# Patient Record
Sex: FEMALE | Race: WHITE | NOT HISPANIC OR LATINO | Employment: STUDENT | URBAN - METROPOLITAN AREA
[De-identification: names, ages, dates, MRNs, and addresses within clinical notes are randomized per-mention and may not be internally consistent; named-entity substitution may affect disease eponyms.]

---

## 2023-03-16 DIAGNOSIS — D68.61 ANTIPHOSPHOLIPID SYNDROME: Primary | ICD-10-CM

## 2023-03-17 ENCOUNTER — LAB (OUTPATIENT)
Dept: LAB | Facility: HOSPITAL | Age: 18
End: 2023-03-17
Payer: COMMERCIAL

## 2023-03-17 ENCOUNTER — CONSULT (OUTPATIENT)
Dept: ONCOLOGY | Facility: CLINIC | Age: 18
End: 2023-03-17
Payer: COMMERCIAL

## 2023-03-17 VITALS
BODY MASS INDEX: 18.3 KG/M2 | WEIGHT: 103.3 LBS | HEART RATE: 78 BPM | OXYGEN SATURATION: 95 % | DIASTOLIC BLOOD PRESSURE: 72 MMHG | HEIGHT: 63 IN | TEMPERATURE: 97.8 F | RESPIRATION RATE: 16 BRPM | SYSTOLIC BLOOD PRESSURE: 113 MMHG

## 2023-03-17 DIAGNOSIS — D72.819 LEUKOPENIA, UNSPECIFIED TYPE: ICD-10-CM

## 2023-03-17 DIAGNOSIS — Z86.2 HISTORY OF THROMBOCYTOPENIA: ICD-10-CM

## 2023-03-17 DIAGNOSIS — Q21.12 PFO (PATENT FORAMEN OVALE): ICD-10-CM

## 2023-03-17 DIAGNOSIS — R76.0 ANTIPHOSPHOLIPID ANTIBODY POSITIVE: Primary | ICD-10-CM

## 2023-03-17 DIAGNOSIS — D68.61 ANTIPHOSPHOLIPID SYNDROME: ICD-10-CM

## 2023-03-17 LAB
BASOPHILS # BLD AUTO: 0.02 10*3/MM3 (ref 0–0.2)
BASOPHILS NFR BLD AUTO: 0.4 % (ref 0–1.5)
DEPRECATED RDW RBC AUTO: 42.3 FL (ref 37–54)
EOSINOPHIL # BLD AUTO: 0.04 10*3/MM3 (ref 0–0.4)
EOSINOPHIL NFR BLD AUTO: 0.8 % (ref 0.3–6.2)
ERYTHROCYTE [DISTWIDTH] IN BLOOD BY AUTOMATED COUNT: 13.3 % (ref 12.3–15.4)
HCT VFR BLD AUTO: 38.3 % (ref 34–46.6)
HGB BLD-MCNC: 12.9 G/DL (ref 12–15.9)
IMM GRANULOCYTES # BLD AUTO: 0.03 10*3/MM3 (ref 0–0.05)
IMM GRANULOCYTES NFR BLD AUTO: 0.6 % (ref 0–0.5)
LYMPHOCYTES # BLD AUTO: 1.53 10*3/MM3 (ref 0.7–3.1)
LYMPHOCYTES NFR BLD AUTO: 32.5 % (ref 19.6–45.3)
MCH RBC QN AUTO: 29.5 PG (ref 26.6–33)
MCHC RBC AUTO-ENTMCNC: 33.7 G/DL (ref 31.5–35.7)
MCV RBC AUTO: 87.6 FL (ref 79–97)
MONOCYTES # BLD AUTO: 0.33 10*3/MM3 (ref 0.1–0.9)
MONOCYTES NFR BLD AUTO: 7 % (ref 5–12)
NEUTROPHILS NFR BLD AUTO: 2.76 10*3/MM3 (ref 1.7–7)
NEUTROPHILS NFR BLD AUTO: 58.7 % (ref 42.7–76)
NRBC BLD AUTO-RTO: 0 /100 WBC (ref 0–0.2)
PLATELET # BLD AUTO: 273 10*3/MM3 (ref 140–450)
PMV BLD AUTO: 9.6 FL (ref 6–12)
RBC # BLD AUTO: 4.37 10*6/MM3 (ref 3.77–5.28)
WBC NRBC COR # BLD: 4.71 10*3/MM3 (ref 3.4–10.8)

## 2023-03-17 PROCEDURE — 85025 COMPLETE CBC W/AUTO DIFF WBC: CPT

## 2023-03-17 PROCEDURE — 36415 COLL VENOUS BLD VENIPUNCTURE: CPT | Performed by: INTERNAL MEDICINE

## 2023-03-17 PROCEDURE — 99244 OFF/OP CNSLTJ NEW/EST MOD 40: CPT | Performed by: INTERNAL MEDICINE

## 2023-03-17 RX ORDER — CETIRIZINE HYDROCHLORIDE 10 MG/1
1 TABLET ORAL DAILY
COMMUNITY

## 2023-03-17 RX ORDER — SERTRALINE HYDROCHLORIDE 25 MG/1
1 TABLET, FILM COATED ORAL DAILY
COMMUNITY
Start: 2022-12-20

## 2023-03-17 RX ORDER — CALCIUM CARBONATE/VITAMIN D3 500-10/5ML
LIQUID (ML) ORAL
COMMUNITY

## 2023-03-17 RX ORDER — MULTIVIT-MIN/IRON/FOLIC ACID/K 18-600-40
CAPSULE ORAL DAILY
COMMUNITY

## 2023-03-17 NOTE — PROGRESS NOTES
"Subjective     REASON FOR CONSULTATION:  Provide an opinion on any further workup or treatment on:    Positive anticardiolipin IgG antibody                       REQUESTING PHYSICIAN: Mary Alice Borjas MD    RECORDS OBTAINED: Records of the patients history including those obtained from the referring provider were reviewed and summarized in detail.    HISTORY OF PRESENT ILLNESS:    Bharati Bright is a 17 y.o. patient who was referred for evaluation of positive anticardiolipin IgG antibody.  Patient has patent foramen ovale diagnosed at birth.  She follows with cardiology.  She was informed that the PFO was small and did not require any intervention.  She is now following with cardiology every 2 years.  No anticoagulation or antiplatelet therapy was recommended.     Patient reports that in September 2021, she felt sick and very weak.  She felt she did not have the energy to do much.  She stayed in bed for prolonged periods of time and describes this as feeling that she \"crashed\".  She did not have fever or chills or other symptoms of infection. Symptoms lasted for 2 weeks.  In late May 2022, she had recurrence of the symptoms but lasted longer.  She had significant fatigue and her family had to help her with moving around the house for about a week.  In August 2022, she had symptoms of upper respiratory infection.  A CBC was done on 8/22/2022 and she was found to have a WBC count of 4470 and platelets of 90,000.  Symptoms improved and of August 2022.    Patient was seen by rheumatology to evaluate the symptoms.  She had labs done on 9/14/2022 and was found to have anticardiolipin IgG of 63.  Labs were repeated on 12/21/2022.  The anticardiolipin IgG was still positive but the titer decreased to 16.  Lupus anticoagulant was not detected.  Antibeta-2 glycoprotein was not detected.    Patient is not having calf pain or swelling.  She reports having intermittent lower extremity pain of unclear etiology.  She has " scoliosis.    REVIEW OF SYSTEMS:  Review of Systems   Constitutional: Positive for fatigue. Negative for fever and unexpected weight change.   HENT: Positive for sinus pressure. Negative for nosebleeds and voice change.    Eyes: Negative for visual disturbance.   Respiratory: Negative for cough and shortness of breath.    Cardiovascular: Negative for chest pain and leg swelling.   Gastrointestinal: Negative for abdominal pain, blood in stool, constipation, diarrhea, nausea and vomiting.   Genitourinary: Negative for frequency and hematuria.   Musculoskeletal: Positive for back pain. Negative for joint swelling.        Leg pain   Skin: Negative for rash.   Neurological: Negative for dizziness and headaches.   Hematological: Negative for adenopathy. Does not bruise/bleed easily.   Psychiatric/Behavioral: Negative for dysphoric mood. The patient is nervous/anxious.       Past Medical History:   Diagnosis Date   • H/O PFO (patent foramen ovale)    • Scoliosis      PSH:  Tonsillectomy in 2008 or 2009    Social History     Socioeconomic History   • Marital status: Single   Tobacco Use   • Smoking status: Never   Substance and Sexual Activity   • Alcohol use: Never   • Drug use: Never   • Sexual activity: Defer     Family History   Problem Relation Age of Onset   • Heart disease Father    • Diabetes Father    • Hypertension Father    • Hypertension Maternal Grandmother    • Heart disease Maternal Grandfather    • Diabetes Maternal Grandfather    • Hypertension Maternal Grandfather    • Leukemia Maternal Grandfather    • Gout Maternal Grandfather       MEDICATIONS:    Current Outpatient Medications:   •  Ascorbic Acid (Vitamin C) 500 MG capsule, Take  by mouth Daily., Disp: , Rfl:   •  cetirizine (zyrTEC) 10 MG tablet, Take 1 tablet by mouth Daily., Disp: , Rfl:   •  Cholecalciferol 50 MCG (2000 UT) tablet, Take 1 tablet by mouth Daily., Disp: , Rfl:   •  Pediatric Multivit-Minerals-C (FLINTSTONES GUMMIES PO), Take  by  "mouth., Disp: , Rfl:   •  sertraline (ZOLOFT) 25 MG tablet, Take 1 tablet by mouth Daily., Disp: , Rfl:   •  Zinc 30 MG capsule, Take  by mouth., Disp: , Rfl:      ALLERGIES:  Allergies   Allergen Reactions   • Minocycline Hives     Hives        Objective   VITAL SIGNS:  Vitals:    03/17/23 0958   BP: 113/72   Pulse: 78   Resp: 16   Temp: 97.8 °F (36.6 °C)   TempSrc: Temporal   SpO2: 95%   Weight: 46.9 kg (103 lb 4.8 oz)   Height: 160 cm (62.99\")  Comment: new ht   PainSc: 0-No pain     PHYSICAL EXAMINATION  GENERAL:  The patient appears in good general condition, not in acute distress.  SKIN: No skin rashes. No ecchymosis.  HEAD:  Normocephalic.  EYES:  No Jaundice. No Pallor. EOMI.  NECK:  Supple with Good ROM. No Thyromegaly. No Masses.  LYMPHATICS:  No cervical lymphadenopathy.  CHEST: Normal respiratory effort. Lungs clear to auscultation.   CARDIAC:  Normal S1 & S2.  Grade 1 systolic murmur.   ABDOMEN: Nondistended.  EXTREMITIES:  No edema.  No varicose veins.  NEUROLOGICAL:  No Focal neurological deficits.     RESULT REVIEW:   Results from last 7 days   Lab Units 03/17/23  0931   WBC 10*3/mm3 4.71   NEUTROS ABS 10*3/mm3 2.76   HEMOGLOBIN g/dL 12.9   HEMATOCRIT % 38.3   PLATELETS 10*3/mm3 273     Lab Results   Component Value Date    FERRITIN 41.6 07/25/2022     Lab Results   Component Value Date    FOLATE 7.4 07/25/2022     Lab Results   Component Value Date    [id number] 407 07/25/2022       Assessment & Plan   *Positive anticardiolipin IgG antibody.  · Patient had significant fatigue x2 weeks in September 2021 and between May and August 2022 of unclear etiology.  · She was evaluated by rheumatology.  · Anticardiolipin IgG was 63 on 9/14/2022.  Antibeta 2 glycoprotein was normal.  No evidence of lupus anticoagulant.  · On 12/21/2022, labs were repeated.  Anticardiolipin IgG decreased to 16.  Testing for beta-2 glycoprotein and lupus anticoagulant was negative.  · Patient is not having typical arthritis " symptoms.  No skin rash.  · There was no definite evidence of underlying rheumatologic disorder on evaluation by the rheumatologist, Dr. Paz.  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  · Patient reports that she had COVID 2 weeks ago.  This may alter the results of today's labs.    *History of leukopenia and thrombocytopenia.  · CBC on 8/22/2022 revealed a WBC of 4470.  · Platelets were 90,000.  · This was likely secondary to a viral infection.  · WBC improved to 4710 today.  · Platelets are normal today at 273,000.  · Immature granulocyte percentage slightly elevated at 0.6%.  Absolute immature granulocytes are normal at 0.03.  · I reassured her that the low WBC and platelet counts were reactive likely secondary to the viral illness she had in 2022.    *Patent foramen ovale.  · She was diagnosed at birth.  · She follows with cardiology.  · Her cardiologist did not recommend any intervention for the PFO and did not recommend anticoagulation/antiplatelet therapy.    PLAN:    1.  Repeat anticardiolipin, beta-2 glycoprotein and lupus anticoagulant tests today.  2.  If the anticardiolipin antibody titer has become normal, a reactive process would be suspected as the cause of the elevation 2022.  Antiphospholipid syndrome would not be suspected and patient would not be expected to be at increased risk for primary thrombosis.  3.  If the level remains elevated, we will repeat the labs in 3-4 months since she just had COVID infection 2 weeks ago.    We will contact the patient with the results and provide further recommendations accordingly.        Zi Villar MD  03/17/23

## 2023-03-18 LAB
CARDIOLIPIN IGA SER IA-ACNC: <9 APL U/ML (ref 0–11)
CARDIOLIPIN IGG SER IA-ACNC: 16 GPL U/ML (ref 0–14)
CARDIOLIPIN IGM SER IA-ACNC: 13 MPL U/ML (ref 0–12)

## 2023-03-19 LAB
B2 GLYCOPROT1 IGA SER-ACNC: <9 GPI IGA UNITS (ref 0–25)
B2 GLYCOPROT1 IGG SER-ACNC: <9 GPI IGG UNITS (ref 0–20)
B2 GLYCOPROT1 IGM SER-ACNC: <9 GPI IGM UNITS (ref 0–32)

## 2023-03-21 LAB
APTT HEX PL PPP: 6 SEC (ref 0–11)
APTT SCREEN TO CONFIRM RATIO: 0.85 RATIO (ref 0–1.34)
CONFIRM APTT/NORMAL: 70.9 SEC (ref 0–47.6)
LA 2 SCREEN W REFLEX-IMP: ABNORMAL
MIXING APTT: 44.2 SEC (ref 0–40.5)
SCREEN APTT: 45.6 SEC (ref 0–43.5)
SCREEN DRVVT: 34.6 SEC (ref 0–47)
THROMBIN TIME: 20.6 SEC (ref 0–23)

## 2023-03-22 ENCOUNTER — TELEPHONE (OUTPATIENT)
Dept: ONCOLOGY | Facility: CLINIC | Age: 18
End: 2023-03-22
Payer: COMMERCIAL

## 2023-03-22 DIAGNOSIS — R76.0 ANTIPHOSPHOLIPID ANTIBODY POSITIVE: Primary | ICD-10-CM

## 2023-03-22 DIAGNOSIS — Z86.2 HISTORY OF THROMBOCYTOPENIA: ICD-10-CM

## 2023-03-22 DIAGNOSIS — D72.819 LEUKOPENIA, UNSPECIFIED TYPE: ICD-10-CM

## 2023-03-22 NOTE — TELEPHONE ENCOUNTER
----- Message from Zi Villar MD sent at 3/22/2023  7:56 AM EDT -----  Please inform the patient that the lab test showed positive anticardiolipin antibodies.  The test may have been affected by the recent COVID infection.  I recommend repeating labs-CBC, anticardiolipin Ab, beta-2 glycoprotein Ab and lupus anticoagulant in 3 months and to see her in follow-up 1 week after to review the results.    Thank you

## 2023-05-30 ENCOUNTER — LAB (OUTPATIENT)
Dept: LAB | Facility: HOSPITAL | Age: 18
End: 2023-05-30

## 2023-05-30 DIAGNOSIS — D72.819 LEUKOPENIA, UNSPECIFIED TYPE: ICD-10-CM

## 2023-05-30 DIAGNOSIS — R76.0 ANTIPHOSPHOLIPID ANTIBODY POSITIVE: ICD-10-CM

## 2023-05-30 DIAGNOSIS — Z86.2 HISTORY OF THROMBOCYTOPENIA: ICD-10-CM

## 2023-05-30 LAB
BASOPHILS # BLD AUTO: 0.02 10*3/MM3 (ref 0–0.2)
BASOPHILS NFR BLD AUTO: 0.5 % (ref 0–1.5)
DEPRECATED RDW RBC AUTO: 43.1 FL (ref 37–54)
EOSINOPHIL # BLD AUTO: 0.07 10*3/MM3 (ref 0–0.4)
EOSINOPHIL NFR BLD AUTO: 1.9 % (ref 0.3–6.2)
ERYTHROCYTE [DISTWIDTH] IN BLOOD BY AUTOMATED COUNT: 12.9 % (ref 12.3–15.4)
HCT VFR BLD AUTO: 41.6 % (ref 34–46.6)
HGB BLD-MCNC: 13.3 G/DL (ref 12–15.9)
IMM GRANULOCYTES # BLD AUTO: 0.01 10*3/MM3 (ref 0–0.05)
IMM GRANULOCYTES NFR BLD AUTO: 0.3 % (ref 0–0.5)
LYMPHOCYTES # BLD AUTO: 1.54 10*3/MM3 (ref 0.7–3.1)
LYMPHOCYTES NFR BLD AUTO: 41.1 % (ref 19.6–45.3)
MCH RBC QN AUTO: 29.2 PG (ref 26.6–33)
MCHC RBC AUTO-ENTMCNC: 32 G/DL (ref 31.5–35.7)
MCV RBC AUTO: 91.4 FL (ref 79–97)
MONOCYTES # BLD AUTO: 0.28 10*3/MM3 (ref 0.1–0.9)
MONOCYTES NFR BLD AUTO: 7.5 % (ref 5–12)
NEUTROPHILS NFR BLD AUTO: 1.83 10*3/MM3 (ref 1.7–7)
NEUTROPHILS NFR BLD AUTO: 48.7 % (ref 42.7–76)
NRBC BLD AUTO-RTO: 0 /100 WBC (ref 0–0.2)
PLATELET # BLD AUTO: 215 10*3/MM3 (ref 140–450)
PMV BLD AUTO: 10.8 FL (ref 6–12)
RBC # BLD AUTO: 4.55 10*6/MM3 (ref 3.77–5.28)
WBC NRBC COR # BLD: 3.75 10*3/MM3 (ref 3.4–10.8)

## 2023-05-30 PROCEDURE — 85025 COMPLETE CBC W/AUTO DIFF WBC: CPT

## 2023-05-30 PROCEDURE — 36415 COLL VENOUS BLD VENIPUNCTURE: CPT

## 2023-05-31 LAB
CARDIOLIPIN IGG SER IA-ACNC: 18 GPL U/ML (ref 0–14)
CARDIOLIPIN IGM SER IA-ACNC: <9 MPL U/ML (ref 0–12)

## 2023-06-01 LAB
APTT HEX PL PPP: 6 SEC (ref 0–11)
APTT SCREEN TO CONFIRM RATIO: 0.99 RATIO (ref 0–1.34)
APTT-LA 1H NP PPP: 43.3 SEC (ref 0–40.5)
B2 GLYCOPROT1 IGA SER-ACNC: <9 GPI IGA UNITS (ref 0–25)
B2 GLYCOPROT1 IGG SER-ACNC: <9 GPI IGG UNITS (ref 0–20)
B2 GLYCOPROT1 IGM SER-ACNC: <9 GPI IGM UNITS (ref 0–32)
CONFIRM APTT/NORMAL: 41 SEC (ref 0–47.6)
LA 2 SCREEN W REFLEX-IMP: ABNORMAL
MIXING APTT: 39.4 SEC (ref 0–40.5)
SCREEN APTT: 43.9 SEC (ref 0–43.5)
SCREEN DRVVT: 34.1 SEC (ref 0–47)
THROMBIN TIME: 17.9 SEC (ref 0–23)

## 2023-06-09 ENCOUNTER — OFFICE VISIT (OUTPATIENT)
Dept: ONCOLOGY | Facility: CLINIC | Age: 18
End: 2023-06-09
Payer: COMMERCIAL

## 2023-06-09 VITALS
TEMPERATURE: 98.2 F | RESPIRATION RATE: 16 BRPM | HEART RATE: 74 BPM | SYSTOLIC BLOOD PRESSURE: 114 MMHG | OXYGEN SATURATION: 99 % | BODY MASS INDEX: 18.38 KG/M2 | HEIGHT: 63 IN | DIASTOLIC BLOOD PRESSURE: 73 MMHG | WEIGHT: 103.7 LBS

## 2023-06-09 DIAGNOSIS — Z86.2 HISTORY OF THROMBOCYTOPENIA: ICD-10-CM

## 2023-06-09 DIAGNOSIS — R76.0 ANTIPHOSPHOLIPID ANTIBODY POSITIVE: Primary | ICD-10-CM

## 2023-06-09 PROCEDURE — 99213 OFFICE O/P EST LOW 20 MIN: CPT | Performed by: INTERNAL MEDICINE

## 2023-06-09 NOTE — PROGRESS NOTES
"Subjective     CHIEF COMPLAINT:      Chief Complaint   Patient presents with    Follow-up     Discuss labs      HISTORY OF PRESENT ILLNESS:     Bharati Bright is a 17 y.o. female patient who returns today for follow up on her positive anticardiolipin antibody. She returns today for follow-up and reports improvement in her energy.  She feels that her endurance is not completely back to normal but it is gradually improving.  No recent infections since her March 2023 visit.      Patient's family reports that she has some episodes of fatigue but may be related to her not eating enough.    ROS:  Pertinent ROS is in the HPI.     Past medical, surgical, social and family history were reviewed.     MEDICATIONS:    Current Outpatient Medications:     Ascorbic Acid (Vitamin C) 500 MG capsule, Take  by mouth Daily., Disp: , Rfl:     cetirizine (zyrTEC) 10 MG tablet, Take 1 tablet by mouth Daily., Disp: , Rfl:     Cholecalciferol 50 MCG (2000 UT) tablet, Take 1 tablet by mouth Daily., Disp: , Rfl:     Pediatric Multivit-Minerals-C (FLINTSTONES GUMMIES PO), Take  by mouth., Disp: , Rfl:     sertraline (ZOLOFT) 25 MG tablet, Take 1 tablet by mouth Daily., Disp: , Rfl:   Objective     VITAL SIGNS:     Vitals:    06/09/23 1017   BP: 114/73   Pulse: 74   Resp: 16   Temp: 98.2 °F (36.8 °C)   TempSrc: Temporal   SpO2: 99%   Weight: 47 kg (103 lb 11.2 oz)   Height: 160 cm (62.99\")   PainSc: 0-No pain     Body mass index is 18.37 kg/m².     PHYSICAL EXAMINATION:   GENERAL: The patient appears in good general condition, not in acute distress.   SKIN: No ecchymosis.  EYES: No jaundice. No pallor.  CHEST: Normal respiratory effort.   CVS: No edema.  ABDOMEN: Non-distended.  EXTREMITIES: No noted deformity.     DIAGNOSTIC DATA:   Component      Latest Ref Rng 3/17/2023 5/30/2023   WBC      3.40 - 10.80 10*3/mm3 4.71  3.75    RBC      3.77 - 5.28 10*6/mm3 4.37  4.55    Hemoglobin      12.0 - 15.9 g/dL 12.9  13.3    Hematocrit      34.0 - " 46.6 % 38.3  41.6    MCV      79.0 - 97.0 fL 87.6  91.4    MCH      26.6 - 33.0 pg 29.5  29.2    MCHC      31.5 - 35.7 g/dL 33.7  32.0    RDW      12.3 - 15.4 % 13.3  12.9    RDW-SD      37.0 - 54.0 fl 42.3  43.1    MPV      6.0 - 12.0 fL 9.6  10.8    Platelets      140 - 450 10*3/mm3 273  215    Neutrophil Rel %      42.7 - 76.0 % 58.7  48.7    Lymphocyte Rel %      19.6 - 45.3 % 32.5  41.1    Monocyte Rel %      5.0 - 12.0 % 7.0  7.5    Eosinophil Rel %      0.3 - 6.2 % 0.8  1.9    Basophil Rel %      0.0 - 1.5 % 0.4  0.5    Immature Granulocyte Rel %      0.0 - 0.5 % 0.6 (H)  0.3    Neutrophils Absolute      1.70 - 7.00 10*3/mm3 2.76  1.83    Lymphocytes Absolute      0.70 - 3.10 10*3/mm3 1.53  1.54    Monocytes Absolute      0.10 - 0.90 10*3/mm3 0.33  0.28    Eosinophils Absolute      0.00 - 0.40 10*3/mm3 0.04  0.07    Basophils Absolute      0.00 - 0.20 10*3/mm3 0.02  0.02    Immature Grans, Absolute      0.00 - 0.05 10*3/mm3 0.03  0.01    nRBC      0.0 - 0.2 /100 WBC 0.0  0.0       Component      Latest Ref Rng 3/17/2023 5/30/2023   Anticardiolipin IgG      0 - 14 GPL U/mL 16 (H)  18 (H)    Anticardiolipin IgM      0 - 12 MPL U/mL 13 (H)  <9    Anticardiolipin IgA      0 - 11 APL U/mL <9     Beta-2 Glyco 1 IgG      0 - 20 GPI IgG units <9  <9    Beta-2 Glyco 1 IgA      0 - 25 GPI IgA units <9  <9    Beta-2 Glyco 1 IgM      0 - 32 GPI IgM units <9  <9       *No lupus anticoagulant was detected.     Assessment & Plan    *Positive anticardiolipin IgG antibody.  Patient had significant fatigue x2 weeks in September 2021 and between May and August 2022 of unclear etiology.  She was evaluated by rheumatology.  Anticardiolipin IgG was 63 on 9/14/2022.  Antibeta 2 glycoprotein was normal.  No evidence of lupus anticoagulant.  On 12/21/2022, labs were repeated.  Anticardiolipin IgG decreased to 16.  Testing for beta-2 glycoprotein and lupus anticoagulant was negative.  Patient is not having typical arthritis symptoms.   No skin rash.  There was no definite evidence of underlying rheumatologic disorder on evaluation by the rheumatologist, Dr. Paz.  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  Patient had COVID in late February/early March 2023.    On 3/17/2023, anticardiolipin IgG was 16, anticardiolipin IgM was 13.   Lupus anticoagulant was not detected.  On 5/30/2023, anticardiolipin IgG was 18, anticardiolipin IgM was <9.  Lupus anticoagulant was not detected.  Although the antibody has persisted, the titer is low.  She is considered to have antiphospholipid syndrome.  I assured the patient about the result.    *History of leukopenia and thrombocytopenia.  CBC on 8/22/2022 revealed a WBC of 4470.  Platelets were 90,000.  WBC improved to 4710 on 3/17/2023  Platelets were normal at 273,000.  Immature granulocyte percentage slightly elevated at 0.6%.  Absolute immature granulocytes are normal at 0.03.  The increase was attributed to COVID-19 in late February/early March 2023.  CBC on 5/30/2023 revealed a normal platelet count of 215,000.  Immature granulocytes were normal at 0.3%.  The previous low platelet count was secondary to infection.    *Patent foramen ovale.  She was diagnosed at birth.  She follows with cardiology.  Her cardiologist did not recommend any intervention for the PFO and did not recommend anticoagulation/antiplatelet therapy.    PLAN:    1.  I reassured the patient about the results.  She is not considered to be at increased risk for thrombosis.  2.  I recommended repeating CBC, anticardiolipin antibody, antibeta 2  Protein and lupus anticoagulant in 1 year.  We will see her in follow-up 1 week after to review the results.          Zi Villar MD  06/09/23

## 2024-05-29 ENCOUNTER — TELEPHONE (OUTPATIENT)
Dept: ONCOLOGY | Facility: CLINIC | Age: 19
End: 2024-05-29

## 2024-05-29 NOTE — TELEPHONE ENCOUNTER
Caller: GENI TREVIÑO    Relationship to patient: FATHER    Best call back number: [phone]    Chief complaint: CANCEL    Type of visit: LAB AND F/U    Requested date: NOT RESCHEDULING AT THIS TIME     If rescheduling, when is the original appointment: 6-7

## 2025-04-01 ENCOUNTER — OFFICE VISIT (OUTPATIENT)
Dept: OBSTETRICS AND GYNECOLOGY | Facility: CLINIC | Age: 20
End: 2025-04-01
Payer: COMMERCIAL

## 2025-04-01 VITALS
HEIGHT: 63 IN | BODY MASS INDEX: 18.78 KG/M2 | WEIGHT: 106 LBS | SYSTOLIC BLOOD PRESSURE: 121 MMHG | DIASTOLIC BLOOD PRESSURE: 74 MMHG

## 2025-04-01 DIAGNOSIS — Z01.419 WOMEN'S ANNUAL ROUTINE GYNECOLOGICAL EXAMINATION: Primary | ICD-10-CM

## 2025-04-01 DIAGNOSIS — Z30.011 ENCOUNTER FOR INITIAL PRESCRIPTION OF CONTRACEPTIVE PILLS: ICD-10-CM

## 2025-04-01 RX ORDER — DROSPIRENONE AND ETHINYL ESTRADIOL 0.02-3(28)
1 KIT ORAL DAILY
Qty: 84 TABLET | Refills: 3 | Status: SHIPPED | OUTPATIENT
Start: 2025-04-01

## 2025-04-01 NOTE — PROGRESS NOTES
GYN Annual Exam     Chief Complaint   Patient presents with    Gynecologic Exam     Pt here today for AE     HPI    Bharati Bright is a 19 y.o. female who presents for annual well woman exam.  She has never been sexually active. Periods are regular every 28-30 days, lasting 5 days. Dysmenorrhea:none. No intermenstrual bleeding, spotting, or discharge. Performing SBE:no. She is getting  in  and would like to discuss contraceptive options. This is my first time meeting Bharati Bright She is here today with her mother. She is new to our office  OB History          0    Para   0    Term   0       0    AB   0    Living   0         SAB   0    IAB   0    Ectopic   0    Molar   0    Multiple   0    Live Births   0              LMP- 3/7/25  Current contraception: abstinence  Last Pap- Never  History of STD-N/A  Family history of uterine, colon or ovarian cancer: no  Family history of breast cancer: no  Gardasil Vaccine: did not complete    Past Medical History:   Diagnosis Date    Anxiety     H/O PFO (patent foramen ovale)     Scoliosis        Past Surgical History:   Procedure Laterality Date    TONSILLECTOMY           Current Outpatient Medications:     cetirizine (zyrTEC) 10 MG tablet, Take 1 tablet by mouth Daily., Disp: , Rfl:     Cholecalciferol 50 MCG (2000) tablet, Take 1 tablet by mouth Daily., Disp: , Rfl:     Pediatric Multivit-Minerals-C (FLINTSTONES GUMMIES PO), Take  by mouth., Disp: , Rfl:     sertraline (ZOLOFT) 25 MG tablet, Take 1 tablet by mouth Daily., Disp: , Rfl:     drospirenone-ethinyl estradiol (RANDAL,GIANVI) 3-0.02 MG per tablet, Take 1 tablet by mouth Daily., Disp: 84 tablet, Rfl: 3    Allergies   Allergen Reactions    Minocycline Hives     Hives         Social History     Tobacco Use    Smoking status: Never    Smokeless tobacco: Never   Vaping Use    Vaping status: Never Used   Substance Use Topics    Alcohol use: Never    Drug use: Never       Family History  "  Problem Relation Age of Onset    Hyperlipidemia Father     Heart disease Father     Diabetes Father     Hypertension Father     Heart attack Father     Hypertension Maternal Grandmother     Heart disease Maternal Grandfather     Diabetes Maternal Grandfather     Hypertension Maternal Grandfather     Leukemia Maternal Grandfather     Gout Maternal Grandfather     Kidney disease Other     Breast cancer Neg Hx     Ovarian cancer Neg Hx     Uterine cancer Neg Hx     Colon cancer Neg Hx      Review of Systems   Constitutional:  Negative for chills, fatigue and fever.   Gastrointestinal:  Negative for abdominal distention, abdominal pain, nausea and vomiting.   Genitourinary:  Negative for breast discharge, breast lump, breast pain, dysuria, frequency, menstrual problem, pelvic pain, pelvic pressure, urgency, vaginal bleeding, vaginal discharge and vaginal pain.   Musculoskeletal:  Negative for gait problem.   Skin:  Negative for rash.   Neurological:  Negative for dizziness and headache.   Psychiatric/Behavioral:  Negative for behavioral problems.        /74   Ht 160 cm (62.99\")   Wt 48.1 kg (106 lb)   LMP 03/07/2025   BMI 18.78 kg/m²     Physical Exam  Constitutional:       General: She is not in acute distress.     Appearance: Normal appearance. She is not ill-appearing, toxic-appearing or diaphoretic.   Genitourinary:      Vulva, bladder and urethral meatus normal.      No lesions in the vagina.      Right Labia: No rash, tenderness, lesions, skin changes or Bartholin's cyst.     Left Labia: No tenderness, lesions, skin changes, Bartholin's cyst or rash.     No labial fusion noted.      No inguinal adenopathy present in the right or left side.     No vaginal discharge, erythema, tenderness, bleeding or ulceration.      No vaginal prolapse present.     No vaginal atrophy present.       Right Adnexa: not tender, not full, not palpable, no mass present and not absent.     Left Adnexa: not tender, not full, " not palpable, no mass present and not absent.     No cervical motion tenderness, discharge, friability, lesion, polyp, nabothian cyst or eversion.      Uterus is not enlarged, fixed, tender, irregular or prolapsed.      No uterine mass detected.     No urethral tenderness or mass present.      Pelvic exam was performed with patient in the lithotomy position.   Breasts:     Breasts are symmetrical.      Right: Present. No swelling, bleeding, inverted nipple, mass, nipple discharge, skin change, tenderness or breast implant.      Left: Present. No swelling, bleeding, inverted nipple, mass, nipple discharge, skin change, tenderness or breast implant.   HENT:      Head: Normocephalic and atraumatic.   Eyes:      Pupils: Pupils are equal, round, and reactive to light.   Cardiovascular:      Rate and Rhythm: Normal rate.   Pulmonary:      Effort: Pulmonary effort is normal.   Abdominal:      General: There is no distension.      Palpations: Abdomen is soft. There is no mass.      Tenderness: There is no abdominal tenderness. There is no guarding.      Hernia: No hernia is present. There is no hernia in the left inguinal area or right inguinal area.   Musculoskeletal:         General: Normal range of motion.      Cervical back: Normal range of motion and neck supple. No tenderness.   Lymphadenopathy:      Cervical: No cervical adenopathy.      Upper Body:      Right upper body: No supraclavicular, axillary or pectoral adenopathy.      Left upper body: No supraclavicular, axillary or pectoral adenopathy.      Lower Body: No right inguinal adenopathy. No left inguinal adenopathy.   Neurological:      General: No focal deficit present.      Mental Status: She is alert and oriented to person, place, and time.      Cranial Nerves: No cranial nerve deficit.   Skin:     General: Skin is warm and dry.   Psychiatric:         Mood and Affect: Mood normal.         Behavior: Behavior normal.         Thought Content: Thought content  normal.         Judgment: Judgment normal.   Vitals and nursing note reviewed.       Assessment   Diagnoses and all orders for this visit:    1. Women's annual routine gynecological examination (Primary)  -     drospirenone-ethinyl estradiol (RANDAL,GIANVI) 3-0.02 MG per tablet; Take 1 tablet by mouth Daily.  Dispense: 84 tablet; Refill: 3  -     IGP, Rfx Aptima HPV ASCU    2. Encounter for initial prescription of contraceptive pills       Plan   Well woman exam: Pap smear collected. Recommend MVI daily.    Contraception: Discussed contraception options at length including pills, patch, vaginal ring, POPs,  injection, implant, and IUDs.  The risks and benefits of the methods were discussed including but not limited to the increased risk of heart attack, blood clot, and stroke.  It was discussed the contraception does not protect against sexually transmitted infections and condoms are encouraged. The patient desires to start ANICETO. Discussed start up, uses, side effects, risks vs benefits. Encouraged condoms for the first 3 weeks of use as back up.    STD: Enc condoms. Desires STD screen today- N/A  Smoking status: nonsmoker   Encouraged annual mammogram screening starting at age 40. Instructed on how to perform SBE. Encouraged breast health self awareness.  6.    Encouraged 150 minutes of exercise per week if not medially contraindicated.   7.    BMI is within normal parameters. No other follow-up for BMI required.  8. Gardasil vaccine: We discussed that this is a vaccine to protect against the human papilloma virus. HPV can cause cervical cancer, as well as genital warts. The vaccine reduces the chance of cervical cancer by up to 70 percent. It also can protect against cancers of the vulva, vagina, anus and possibly laryngeal cancers. The vaccine is recommended for girls and boys the recommended age is 11-12, with catch up vaccination for anyone over that age until the age of 27. There are 3 vaccines in the series.      Return in about 1 year (around 4/1/2026) for Annual physical, SUDEEP Miguel.    SUDEEP Concepcion  4/1/2025  14:03 EDT

## 2025-04-03 LAB
CONV .: NORMAL
CYTOLOGIST CVX/VAG CYTO: NORMAL
CYTOLOGY CVX/VAG DOC CYTO: NORMAL
CYTOLOGY CVX/VAG DOC THIN PREP: NORMAL
DX ICD CODE: NORMAL
OTHER STN SPEC: NORMAL
SERVICE CMNT-IMP: NORMAL
STAT OF ADQ CVX/VAG CYTO-IMP: NORMAL